# Patient Record
Sex: FEMALE | Race: WHITE
[De-identification: names, ages, dates, MRNs, and addresses within clinical notes are randomized per-mention and may not be internally consistent; named-entity substitution may affect disease eponyms.]

---

## 2017-06-30 ENCOUNTER — HOSPITAL ENCOUNTER (EMERGENCY)
Dept: HOSPITAL 62 - ER | Age: 72
Discharge: LEFT BEFORE DISCHARGE | End: 2017-06-30
Payer: MEDICARE

## 2017-06-30 VITALS — DIASTOLIC BLOOD PRESSURE: 81 MMHG | SYSTOLIC BLOOD PRESSURE: 148 MMHG

## 2017-06-30 DIAGNOSIS — Z53.21: Primary | ICD-10-CM

## 2019-01-31 ENCOUNTER — HOSPITAL ENCOUNTER (EMERGENCY)
Dept: HOSPITAL 62 - ER | Age: 74
Discharge: HOME | End: 2019-01-31
Payer: MEDICARE

## 2019-01-31 VITALS — SYSTOLIC BLOOD PRESSURE: 137 MMHG | DIASTOLIC BLOOD PRESSURE: 66 MMHG

## 2019-01-31 DIAGNOSIS — Z88.2: ICD-10-CM

## 2019-01-31 DIAGNOSIS — Z87.442: ICD-10-CM

## 2019-01-31 DIAGNOSIS — N12: Primary | ICD-10-CM

## 2019-01-31 DIAGNOSIS — Z88.5: ICD-10-CM

## 2019-01-31 LAB
ADD MANUAL DIFF: NO
ANION GAP SERPL CALC-SCNC: 9 MMOL/L (ref 5–19)
APPEARANCE UR: (no result)
APTT PPP: YELLOW S
BASOPHILS # BLD AUTO: 0.1 10^3/UL (ref 0–0.2)
BASOPHILS NFR BLD AUTO: 0.6 % (ref 0–2)
BILIRUB UR QL STRIP: NEGATIVE
BUN SERPL-MCNC: 18 MG/DL (ref 7–20)
CALCIUM: 9.4 MG/DL (ref 8.4–10.2)
CHLORIDE SERPL-SCNC: 108 MMOL/L (ref 98–107)
CO2 SERPL-SCNC: 23 MMOL/L (ref 22–30)
EOSINOPHIL # BLD AUTO: 0.2 10^3/UL (ref 0–0.6)
EOSINOPHIL NFR BLD AUTO: 2.6 % (ref 0–6)
ERYTHROCYTE [DISTWIDTH] IN BLOOD BY AUTOMATED COUNT: 13.6 % (ref 11.5–14)
GLUCOSE SERPL-MCNC: 93 MG/DL (ref 75–110)
GLUCOSE UR STRIP-MCNC: NEGATIVE MG/DL
HCT VFR BLD CALC: 39.9 % (ref 36–47)
HGB BLD-MCNC: 13.7 G/DL (ref 12–15.5)
KETONES UR STRIP-MCNC: NEGATIVE MG/DL
LYMPHOCYTES # BLD AUTO: 3.8 10^3/UL (ref 0.5–4.7)
LYMPHOCYTES NFR BLD AUTO: 39.7 % (ref 13–45)
MCH RBC QN AUTO: 31 PG (ref 27–33.4)
MCHC RBC AUTO-ENTMCNC: 34.4 G/DL (ref 32–36)
MCV RBC AUTO: 90 FL (ref 80–97)
MONOCYTES # BLD AUTO: 1.3 10^3/UL (ref 0.1–1.4)
MONOCYTES NFR BLD AUTO: 13.4 % (ref 3–13)
NEUTROPHILS # BLD AUTO: 4.2 10^3/UL (ref 1.7–8.2)
NEUTS SEG NFR BLD AUTO: 43.7 % (ref 42–78)
NITRITE UR QL STRIP: NEGATIVE
PH UR STRIP: 5 [PH] (ref 5–9)
PLATELET # BLD: 369 10^3/UL (ref 150–450)
POTASSIUM SERPL-SCNC: 4.4 MMOL/L (ref 3.6–5)
PROT UR STRIP-MCNC: NEGATIVE MG/DL
RBC # BLD AUTO: 4.42 10^6/UL (ref 3.72–5.28)
SODIUM SERPL-SCNC: 139.5 MMOL/L (ref 137–145)
SP GR UR STRIP: 1.02
TOTAL CELLS COUNTED % (AUTO): 100 %
UROBILINOGEN UR-MCNC: NEGATIVE MG/DL (ref ?–2)
WBC # BLD AUTO: 9.6 10^3/UL (ref 4–10.5)

## 2019-01-31 PROCEDURE — 85025 COMPLETE CBC W/AUTO DIFF WBC: CPT

## 2019-01-31 PROCEDURE — 74176 CT ABD & PELVIS W/O CONTRAST: CPT

## 2019-01-31 PROCEDURE — 36415 COLL VENOUS BLD VENIPUNCTURE: CPT

## 2019-01-31 PROCEDURE — 87086 URINE CULTURE/COLONY COUNT: CPT

## 2019-01-31 PROCEDURE — 96374 THER/PROPH/DIAG INJ IV PUSH: CPT

## 2019-01-31 PROCEDURE — 96375 TX/PRO/DX INJ NEW DRUG ADDON: CPT

## 2019-01-31 PROCEDURE — 99284 EMERGENCY DEPT VISIT MOD MDM: CPT

## 2019-01-31 PROCEDURE — 80048 BASIC METABOLIC PNL TOTAL CA: CPT

## 2019-01-31 PROCEDURE — 81001 URINALYSIS AUTO W/SCOPE: CPT

## 2019-01-31 NOTE — RADIOLOGY REPORT (SQ)
EXAM DESCRIPTION:

CT ABDOMEN PELVIS WITHOUT IV CONTRAST



COMPLETED DATE/TME:  01/31/2019 06:26



CLINICAL HISTORY:

73 years Female, History of stones, left flank pain



Comparison: None.



Technique:  No contrast.  Coronal and sagittal reformat.  This

exam was performed according to our departmental

dose-optimization program, which includes automated exposure

control, adjustment of the mA and/or kV according to patient size

and/or use of iterative reconstruction technique.[facility]: Dose Right

[facility]: CareDose   [facility]: Dose Right    [facility]: Teradose 4D    [facility]:

[employer]



LIMITATIONS:

None



Findings: 

Appendectomy.  Cholecystectomy.  Moderate fat replacement of the

pancreatic head.  Likely benign renal cyst(s), not definitively

characterized.  Punctate right renal stone.  Colonic

diverticulosis.  Stool retention.  Degenerative disc disease.  

Unenhanced lower thorax, abdominopelvic structures, and

musculoskeleton appear otherwise grossly unremarkable.



Impression:  No acute findings. Punctate right nephrolithiasis.

## 2019-01-31 NOTE — ER DOCUMENT REPORT
ED General





- General


Chief Complaint: Flank Pain


Stated Complaint: BACK PAIN


Time Seen by Provider: 01/31/19 06:21


Primary Care Provider: 


[facility] [Provider Group] - Follow up in 1 week


Notes: 





73-year-old female presents with left-sided lower pubic/suprapubic pain 

radiating to the left flank started 2 days ago, with stinging with urination 

that she thought was a UTI but is failed to improve with Cipro that she had 

"lying around" as well as some old "pain pills."  Fever chills no history of 

dental surgery, positive history of stones and infections he denies symptoms 

down her legs, and denies vomiting but has some mild nausea.  History of total 

hysterectomy.


TRAVEL OUTSIDE OF THE [place] IN LAST 30 DAYS: No





- Related Data


Allergies/Adverse Reactions: 


                                        





codeine [Codeine] Allergy (Verified 03/09/16 19:04)


   


Sulfa (Sulfonamide Antibiotics) Allergy (Verified 03/09/16 19:04)


   











Past Medical History





- Social History


Smoking Status: Never Smoker


Family History: CVA, Malignancy





- Past Medical History


Cardiac Medical History: 


   Denies: Hx Coronary Artery Disease, Hx Heart Attack, Hx Hypercholesterolemia,

 Hx Hypertension, Hx Peripheral Vascular Disease, Hx Pulmonary Embolism, Hx 

Heart Murmur


Pulmonary Medical History: 


   Denies: Hx Asthma, Hx Bronchitis, Hx COPD, Hx Pneumonia, Hx Tuberculosis


Neurological Medical History: Denies: Hx Cerebrovascular Accident, Hx Seizures


Endocrine Medical History: Denies: Hx Diabetes Mellitus Type 1


Renal/ Medical History: Reports: Hx Kidney Stones.  Denies: Hx Peritoneal 

Dialysis


GI Medical History: Reports: Hx Hepatitis - C


Infectious Medical History: Reports: Hx Hepatitis - C


Past Surgical History: Reports: Hx Appendectomy, Hx Hysterectomy, Hx Kidney 

(Renal Surgery) - Removal of Kidney stones, Hx Oral Surgery.  Denies: Hx 

Pacemaker





- Immunizations


Hx Diphtheria, Pertussis, Tetanus Vaccination: Yes


Hx Pneumococcal Vaccination: 10/01/07





Review of Systems





- Review of Systems


Notes: 





REVIEW OF SYSTEMS





GEN: Denies fever, chills, weight loss


ENT: Denies sore throat, nasal discharge, ear pain


EYES: Denies blurry vision, eye pain, discharge


CV: Denies chest pain, palpitations, edema


RESP: Denies cough, shortness of breath, wheezing


GI: Dental pain nausea dysuria


MSK: Denies joint pain/swelling, edema, 


SKIN: Denies rash, skin lesions


LYMPH: Denies swollen glands/lymph nodes


NEURO: Denies headache, focal weakness or numbness, dizziness


PSYCH: Denies depression, suicidal or homicidal ideation








PHYSICAL EXAMINATION





General: No acute distress, well-nourished


Head: Atraumatic, normocephalic


ENT: Mouth normal, oropharynx moist, no exudates or tonsillar enlargement


Eyes: Conjunctiva normal, pupils equal, lids normal


Neck: No JVD, supple, no guarding


CVS: Normal rate, regular rhythm, no murmurs


Resp: No resp distress, equal and normal breath sounds bilaterally


GI: Nondistended, soft, moderate left sided suprapubic tenderness to palpation, 

no rebound or guarding


Ext: No deformities, no edema, normal range of motion in upper and lower ext


Back: Paraspinous versus CVA tenderness


Skin: No rash, warm


Lymphatic: No lymphadeopathy noted


Neuro: Awake, alert.  Face symmetric.  GCS 15.





Physical Exam





- Vital signs


Vitals: 


                                        











Temp Pulse Resp BP Pulse Ox


 


 97.6 F   66   20   151/84 H  98 


 


 01/31/19 05:54  01/31/19 05:54  01/31/19 05:54  01/31/19 05:54  01/31/19 05:54














Course





- Re-evaluation


Re-evalutation: 





01/31/19 06:37


Abdominal and flank pain in a patient with a history of both UTIs and kidney st

ones.  She has very minimal tenderness in the anterior portion of the abdomen 

but does have CVA tenderness.  Will test for kidney stone with CT, check labs 

and urine.  Will treat pain.


01/31/19 13:28


Labs normal except for positive UTI.  CT negative for kidney stone.  Pain 

relieved with opioids in the ED.  Discharge home with short course of opioids 

given the severity of her pain, Zofran, and Ceftin.  Insert discharge


01/31/19 13:28


I have discussed with the patient there likely diagnosis, aftercare plan, 

follow-up plans and my usual and customary return precautions.  They verbalized 

understanding of this.





- Vital Signs


Vital signs: 


                                        











Temp Pulse Resp BP Pulse Ox


 


 98.5 F   55 L  16   137/66 H  100 


 


 01/31/19 08:08  01/31/19 08:08  01/31/19 08:08  01/31/19 08:08  01/31/19 08:08














- Laboratory


Result Diagrams: 


                                 01/31/19 06:15





                                 01/31/19 06:15


Laboratory results interpreted by me: 


                                        











  01/31/19 01/31/19 01/31/19





  06:15 06:15 06:15


 


Monocytes %  13.4 H  


 


Chloride   108 H 


 


Est GFR ( Amer)   55 L 


 


Est GFR (Non-Af Amer)   46 L 


 


Ur Leukocyte Esterase    MODERATE H














- Diagnostic Test


Radiology reviewed: Image reviewed, Reports reviewed





Discharge





- Discharge


Clinical Impression: 


 Pyelonephritis





Condition: Good


Disposition: HOME, SELF-CARE


Instructions:  Antinausea Medication ([facility]), Oral Narcotic Medication ([facility]), 

Pyelonephritis ([facility])


Prescriptions: 


Cefuroxime Axetil [Ceftin 500 mg Tablet] 1 tab PO BID #20 tablet


Ondansetron HCl [Zofran 4 mg Tablet] 1 - 2 tab PO Q4H PRN #10 tablet


 PRN Reason: 


Oxycodone HCl/Acetaminophen [Percocet 5-325 mg Tablet] 1 tab PO ASDIR PRN #15 

tab


 PRN Reason: 


Referrals: 


[facility] [Provider Group] - Follow up in 1 week